# Patient Record
(demographics unavailable — no encounter records)

---

## 2024-11-26 NOTE — HISTORY OF PRESENT ILLNESS
[FreeTextEntry1] : Patient presents today for evaluation of multiple issues. She has bunions and hammertoes, progressing deformity that causes her grief and sensitivity. She has right venous insufficiency, right edema, but it has been annoying more recently. She is followed by vascular.

## 2024-11-26 NOTE — PHYSICAL EXAM
[0] : left foot posterior tibialis 0 [1+] : left foot dorsalis pedis 1+ [Vibration Dec.] : diminished vibratory sensation at the level of the toes [Position Sense Dec.] : diminished position sense at the level of the toes [Diminished Throughout Right Foot] : diminished sensation with monofilament testing throughout right foot [Diminished Throughout Left Foot] : diminished sensation with monofilament testing throughout left foot [FreeTextEntry3] : Thin, atrophic skin The patient has a class finding of Q8-Two Class B findings.  [de-identified] : She has generalized arthritis with bunions and hammertoes appreciated bilateral, minimally symptomatic  [FreeTextEntry1] : [place]-[employer] monofilament testing absent at the hallux, 1st MPJ and heel bilateral.

## 2024-11-26 NOTE — ASSESSMENT
[FreeTextEntry1] : Impression: Venous insufficiency. Varicose veins with inflammation. Hammertoe.  Treatment: I enucleated keratotic lesions, after prepping with alcohol. I discussed appropriate shoe gear and stretching and using soaks and Biofreeze for the arthritic hammertoes. I sent her for a venous duplex to R/O DVT.

## 2024-11-26 NOTE — PHYSICAL EXAM
[0] : left foot posterior tibialis 0 [1+] : left foot dorsalis pedis 1+ [Vibration Dec.] : diminished vibratory sensation at the level of the toes [Position Sense Dec.] : diminished position sense at the level of the toes [Diminished Throughout Right Foot] : diminished sensation with monofilament testing throughout right foot [Diminished Throughout Left Foot] : diminished sensation with monofilament testing throughout left foot [FreeTextEntry3] : Thin, atrophic skin The patient has a class finding of Q8-Two Class B findings.  [de-identified] : She has generalized arthritis with bunions and hammertoes appreciated bilateral, minimally symptomatic  [FreeTextEntry1] : [place]-Jc monofilament testing absent at the hallux, 1st MPJ and heel bilateral.

## 2024-11-27 NOTE — PLAN
[No] : No [Medication education provided] : Medication education provided. [Rationale for medication choices, possible risks/precautions, benefits, alternative treatment choices, and consequences of non-treatment discussed] : Rationale for medication choices, possible risks/precautions, benefits, alternative treatment choices, and consequences of non-treatment discussed with patient/family/caregiver  [FreeTextEntry5] : Psychoeducation and supportive therapy provided and discussed rationale for recommended med changes  Behavior activation Sleep hygiene education.  Medication:   Continue L-methylfolate 15 mg 1 tablet daily.   Continue desvenlafaxine 50 mg daily. Continue mirtazapine 30 mg HS  Educated patient of importance of remaining abstinent from drugs and alcohol.  Emergency procedures were discussed: pt. educated to call 911 or go to nearest ER for worsening of symptoms/suicidal/homicidal ideation.  RTC in 6-8 weeks or earlier as needed  Recommended patient to consider seeing a therapist for individual psychotherapy but that she feels that it was not helpful in the past and she is not interested at this time. Patient given opportunity to ask questions and their questions were answered and they expressed understanding and agreement with above plan.  I spent a total of 20 minutes for today's visit in evaluating and treating the patient as per above, including: preparing for patient's appointment (review of prior documents, [facility] ), performing a medically necessary exam/evaluation, communicating/counseling/educating the patient ordering medications

## 2024-11-27 NOTE — PHYSICAL EXAM
[None] : none [Average] : average [Cooperative] : cooperative [Full] : full [Clear] : clear [Linear/Goal Directed] : linear/goal directed [None Reported] : none reported [WNL] : within normal limits [FreeTextEntry8] : "a little better" [FreeTextEntry7] : No SI/HI

## 2024-11-27 NOTE — REASON FOR VISIT
[Patient preference] : as per patient preference [Continuity of care] : to ensure continuity of care [Telephone (audio) - Individual/Group] : This telephonic visit was provided via audio only technology. [Other Location: e.g. Home (Enter Location, City,State)___] : The provider was located at [unfilled]. [Home] : The patient, [unfilled], was located at home, [unfilled], at the time of the visit. [Patient's space is appropriate for telehealth and maintains privacy/confidentiality.] : Patient's space is appropriate for telehealth and maintains privacy/confidentiality. [Participant(s) identity verified] : Participant(s) identity verified. [Verbal consent obtained from patient/other participant(s)] : Verbal consent for telehealth/telephonic services obtained from patient/other participant(s) [Patient] : Patient [FreeTextEntry1] : depression

## 2024-11-27 NOTE — HISTORY OF PRESENT ILLNESS
[FreeTextEntry1] : Med changes made on last visit: None Patient states that the medication is "starting to help".  Patient reported that she is "not lazy anymore" and that she has been doing paperwork and things around the house.  Patient states that she also is going out to run errands made friends and they will go to [employer] regularly.  Patient reported her mood is better.  She reported sleeping from 11:30 PM to 9 AM and may wake up to use the bathroom once unable to fall back within a few minutes.  Patient reported her appetite is "a little better".  Patient denied feeling hopeless or helpless.  Patient denied passive or active SI.  Patient reported that she did not feel depressed in the last 2 days in general since the last visit she rates her depression symptoms at 3/10, 10 being the worst.  Patient reported that she is considering visiting her children for Thanksgiving. 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

## 2024-11-27 NOTE — PLAN
[No] : No [Medication education provided] : Medication education provided. [Rationale for medication choices, possible risks/precautions, benefits, alternative treatment choices, and consequences of non-treatment discussed] : Rationale for medication choices, possible risks/precautions, benefits, alternative treatment choices, and consequences of non-treatment discussed with patient/family/caregiver  [FreeTextEntry5] : Psychoeducation and supportive therapy provided and discussed rationale for recommended med changes  Behavior activation Sleep hygiene education.  Medication:   Continue L-methylfolate 15 mg 1 tablet daily.   Continue desvenlafaxine 50 mg daily. Continue mirtazapine 30 mg HS  Educated patient of importance of remaining abstinent from drugs and alcohol.  Emergency procedures were discussed: pt. educated to call 911 or go to nearest ER for worsening of symptoms/suicidal/homicidal ideation.  RTC in 6-8 weeks or earlier as needed  Recommended patient to consider seeing a therapist for individual psychotherapy but that she feels that it was not helpful in the past and she is not interested at this time. Patient given opportunity to ask questions and their questions were answered and they expressed understanding and agreement with above plan.  I spent a total of 20 minutes for today's visit in evaluating and treating the patient as per above, including: preparing for patient's appointment (review of prior documents, [facility] ), performing a medically necessary exam/evaluation, communicating/counseling/educating the patient ordering medications

## 2024-11-27 NOTE — HISTORY OF PRESENT ILLNESS
[FreeTextEntry1] : Med changes made on last visit: None Patient states that the medication is "starting to help".  Patient reported that she is "not lazy anymore" and that she has been doing paperwork and things around the house.  Patient states that she also is going out to run errands made friends and they will go to [employer] regularly.  Patient reported her mood is better.  She reported sleeping from 11:30 PM to 9 AM and may wake up to use the bathroom once unable to fall back within a few minutes.  Patient reported her appetite is "a little better".  Patient denied feeling hopeless or helpless.  Patient denied passive or active SI.  Patient reported that she did not feel depressed in the last 2 days in general since the last visit she rates her depression symptoms at 3/10, 10 being the worst.  Patient reported that she is considering visiting her children for Thanksgiving. 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

## 2024-11-28 NOTE — DISCUSSION/SUMMARY
[FreeTextEntry1] : Patient had a telehealth visit scheduled today at 11:30 AM.  Patient did not log into the appointment until 11:35 AM, writer called and left message for patient to log in to the visit ASAP or call office to reschedule if unable to log in today. Patient did not log in for the visit today.

## 2025-01-20 NOTE — REASON FOR VISIT
[Telehealth (audio & video) - Individual/Group] : This visit was provided via telehealth using real-time 2-way audio visual technology. [Medical Office: ([facility])___] : The provider was located at the medical office in [unfilled]. [FreeTextEntry1] : depression

## 2025-01-20 NOTE — DISCUSSION/SUMMARY
[FreeTextEntry1] : 11/1/2022: Patient is an 80-year-old woman with a history of symptoms consistent with major depressive disorder, recurrent with onset in early 50s, multiple medication trials with partial benefit, and she was on Prozac for when the symptoms of depression returned in fall 2021, and was doing well till beginning of September 2022 when her depression symptoms started to come back and the dose was increased to 60 mg daily and in the past 6 weeks she feels she is not seeing any improvement of symptoms of depression.  11/29/2022: Patient reported no side effects from Cymbalta.  She reported slight improvement of symptoms of depression, we will increase the Cymbalta dose for increased therapeutic benefit.  1/11/2023: Patient reported improvement of symptoms of depression after the last dose increase of Cymbalta and that she denied any side effects.  2/21/2023: Patient reported further improvement symptoms of depression since last visit and states that she no longer feels depressed and feels that the medications are effective lately keeping her depression symptoms in control  6/5/2023: Patient reported that she is feeling much better even though slight anxiety related to her lower leg edema she feels that the current medication regimen is keeping her symptoms of depression and anxiety in good control.  8/28/2023: Patient reported she continues to feel good with stable mood and no symptoms of depression still has some lower leg issues but feels that she is overall doing well psychiatrically even though she sometimes has vivid dreams which she had on previous antidepressants as well and they are not interfering with her sleep and she does not want to make any change to her antidepressants because she feels that these meds are working well.  11/14/2023: Patient reported that for about 3 to 4 weeks she is starting to feel depressed again with the low energy motivation and desire to do things feels like she is almost back to how she was feeling before duloxetine was added.  Patient reported that she had multiple medication trials including SSRIs and SNRIs and bupropion as well but they seem to work for a little bit and then stopped working.  Patient previously was on mirtazapine 45 mg at bedtime and had felt that it was not working so was augmented with Prozac but that did not seem to have a good effect and that is when fluoxetine was switched to duloxetine.   12/19/2023: Patient reported no change in her symptoms of depression since last visit, denied any adverse effects from Cymbalta 60 +20 mg daily.  Patient is going away on a trip for the holidays and prefers not to change medications if needed agreeable to increasing the Cymbalta dose for increased therapeutic benefit before considering any switch to other medications as patient already had multiple medication trials and switching right before she is going to travel would be more risk than benefit.  1/9/2024: The patient did not increase duloxetine dose, due to concerns about her BP being high normal at previous dose. She continues to feel depressed. She had multiple medication trials was stable on duloxetine 60 mg/day for several months until about a few weeks ago, when she started to experience reemergence of depression symptoms and would need a different medication trial.   1/26/2024: Patient tolerated tapering off the Cymbalta with mild withdrawal symptoms and is not experiencing any adverse effects from Trintellix.  Advised patient to increase the Trintellix dose to 10 mg for increased therapeutic benefit.  For now, we will continue the mirtazapine at the current dose and may consider lowering the dose or keeping it if it appears to be documenting Trintellix with the good effect on her mood symptoms.  2/16/2024: Patient reported no adverse effects from combination of Trintellix at current dose and the mirtazapine and she reports that for past couple of days she is feeling "a little better" and had a little more desire and energy to do things.  Patient was on Trintellix 10 mg for almost 2 and half to 3 weeks and since she is tolerating the current dose we will increase the dose for increased therapeutic benefit.  3/18/2024: Patient states she is doing more activities than before, no side effects from before, but states her mood is still not back to her usual and she is still feeling depressed. and since she is tolerating the current dose we will increase the dose for increased therapeutic benefit.  4/29/2024: Patient reports that she is "still not feeling great" and has low energy and motivation but appears to be doing more activities and reports that when she is out with friends and doing things her mood lives and that she is able to enjoy these activities.  Reviewed with patient past medication trials and appears that she has been a slow responder or only had partial response to medications discussed alternate treatment options such as switching to another medication however that would require coming off the medications that are partially effective at this time in order not to add more meds to avoid polypharmacy or considering TMS as an alternate treatment option and gold standard ECT however that would require more support and patient lives alone.  6/11/2024: The patient reported she is feeling a little better, but still depressed with low energy and motivation, and she does not feel depressed when she is with other people and she has been doing more activities than before, but still feels depressed.  She has multiple med trials, and in the past was a slow responder and the medication appears to have lost it's effect in a few months or a few years or had partial response to medications. Again, discussed alternate treatment options such as switching to another medication however that would require coming off the medications that are partially effective at this time in order not to add more meds to avoid polypharmacy or considering TMS as an alternate treatment option and gold standard ECT however that would require more support and patient lives alone. We could also consider pharmacogenomic testing for patients' genetic information for pharmacokinetic and antidepressant response to meds and based on the we may be able to make more informed decisions regarding med doses and or med switch.  7/31/2024: Patient reported she still continues to feel depressed and not back to her baseline and based on pharmacogenomic test results she may benefit from supplementing L-methylfolate to enhance the effects of her SSRI or SNRI.  Reviewed with the patient option to switch Trintellix to another SNRI and add L-methylfolate based on the pharmacogenomic test results and she is agreeable with the plan. 08/28/2024: The patient is tolerating well addition of desvenlafaxine, and L-methylfolate and reported improving symptoms of depression and prefers to continue same dose and monitor for incremental improvement.  10/2/2024: Patient reports an improvement of symptoms of depression, tolerating current medications and would like to continue the same dosage and monitor for incremental improvement of symptoms. 1/17/2025: Patient reported depression symptoms have significantly reduced and that she feels close to her baseline and appears to have her symptoms in remission with the current medication regimen which she is tolerating well.

## 2025-01-20 NOTE — REASON FOR VISIT
[Telehealth (audio & video) - Individual/Group] : This visit was provided via telehealth using real-time 2-way audio visual technology. [Medical Office: ([facility])___] : The provider was located at the medical office in [unfilled]. [FreeTextEntry1] : depression

## 2025-01-20 NOTE — HISTORY OF PRESENT ILLNESS
[FreeTextEntry1] : Med changes made on last visit: NonePatient apologized for missing her last appointment on November 27.  Patient states that she was busy packing to leave for Thanksgiving holiday as she was visiting her son.  Patient states that she has been doing very well since last visit and reports depression symptoms have significantly reduced, rates her depression symptoms at 1/10, 10 being the worst.  Patient reported that her appetite is good and that she is more active and engaged in doing activities, sees her friends for coffee and lunch etc. and playing [employer].  She also reported that going back to visit her son and spend the holidays with them.  Patient states that that she is falling asleep around 11:30 PM and is waking up around 6 AM and lays in the bed for another hour.  Patient states that she is only sleeping about 5 to 6 hours though has no trouble falling asleep and sleep is not interrupted and she does not feel tired when she wakes up in the morning.  Patient denied feeling hopeless or helpless and denied passive or active suicidal ideation. Patient reported no excessive alcohol use and denied any substance use. Patient reported that taking medications as prescribed and reports no side effects. Patient denied any new medical issues or medications since last visit.  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

## 2025-01-20 NOTE — HISTORY OF PRESENT ILLNESS
[FreeTextEntry1] : Med changes made on last visit: NonePatient apologized for missing her last appointment on November 27.  Patient states that she was busy packing to leave for Thanksgiving holiday as she was visiting her son.  Patient states that she has been doing very well since last visit and reports depression symptoms have significantly reduced, rates her depression symptoms at 1/10, 10 being the worst.  Patient reported that her appetite is good and that she is more active and engaged in doing activities, sees her friends for coffee and lunch etc. and playing [employer].  She also reported that going back to visit her son and spend the holidays with them.  Patient states that that she is falling asleep around 11:30 PM and is waking up around 6 AM and lays in the bed for another hour.  Patient states that she is only sleeping about 5 to 6 hours though has no trouble falling asleep and sleep is not interrupted and she does not feel tired when she wakes up in the morning.  Patient denied feeling hopeless or helpless and denied passive or active suicidal ideation. Patient reported no excessive alcohol use and denied any substance use. Patient reported that taking medications as prescribed and reports no side effects. Patient denied any new medical issues or medications since last visit.  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

## 2025-01-20 NOTE — PLAN
[FreeTextEntry5] : Psychoeducation and supportive therapy provided and discussed rationale for recommended med changes  Behavior activation Sleep hygiene education.  Medication:   Continue L-methylfolate 15 mg 1 tablet daily.   Continue desvenlafaxine 50 mg daily. Continue mirtazapine 30 mg HS  Educated patient of importance of remaining abstinent from drugs and alcohol.  Emergency procedures were discussed: pt. educated to call 911 or go to nearest ER for worsening of symptoms/suicidal/homicidal ideation.  RTC in 6-8 weeks or earlier as needed  Recommended patient to consider seeing a therapist for individual psychotherapy but that she feels that it was not helpful in the past and she is not interested at this time. Patient given opportunity to ask questions and their questions were answered and they expressed understanding and agreement with above plan.

## 2025-02-03 NOTE — PHYSICAL EXAM
[0] : left foot posterior tibialis 0 [1+] : left foot dorsalis pedis 1+ [Vibration Dec.] : diminished vibratory sensation at the level of the toes [Position Sense Dec.] : diminished position sense at the level of the toes [Diminished Throughout Right Foot] : diminished sensation with monofilament testing throughout right foot [Diminished Throughout Left Foot] : diminished sensation with monofilament testing throughout left foot [FreeTextEntry3] : Thin, atrophic skin The patient has a class finding of Q8-Two Class B findings.  [de-identified] : She has generalized arthritis with bunions and hammertoes appreciated bilateral, minimally symptomatic. Preulcerative lesion at the 2nd toe, left foot. [FreeTextEntry1] : [place]-[employer] monofilament testing absent at the hallux, 1st MPJ and heel bilateral.

## 2025-02-03 NOTE — HISTORY OF PRESENT ILLNESS
[FreeTextEntry1] : Patient presents today for routine foot care. She has hammertoes with preulcerative keratotic lesion that she cannot care for herself.  The patient's history and physical has been reviewed and verified with no changes.

## 2025-02-03 NOTE — PHYSICAL EXAM
[0] : left foot posterior tibialis 0 [1+] : left foot dorsalis pedis 1+ [Vibration Dec.] : diminished vibratory sensation at the level of the toes [Position Sense Dec.] : diminished position sense at the level of the toes [Diminished Throughout Right Foot] : diminished sensation with monofilament testing throughout right foot [Diminished Throughout Left Foot] : diminished sensation with monofilament testing throughout left foot [FreeTextEntry3] : Thin, atrophic skin The patient has a class finding of Q8-Two Class B findings.  [de-identified] : She has generalized arthritis with bunions and hammertoes appreciated bilateral, minimally symptomatic. Preulcerative lesion at the 2nd toe, left foot. [FreeTextEntry1] : [place]-Jc monofilament testing absent at the hallux, 1st MPJ and heel bilateral.

## 2025-02-03 NOTE — ASSESSMENT
[FreeTextEntry1] : Impression; Celine. Pain.  Treatment: The left 2nd toe was prepped with alcohol, and I trimmed the keratotic lesion without incident. I made toe crest pads. I want her to stretch and wear memory foam shoes. Follow-up in the office as needed. She should follow-up with Vascular regarding her veins.

## 2025-02-03 NOTE — PHYSICAL EXAM
[0] : left foot posterior tibialis 0 [1+] : left foot dorsalis pedis 1+ [Vibration Dec.] : diminished vibratory sensation at the level of the toes [Position Sense Dec.] : diminished position sense at the level of the toes [Diminished Throughout Right Foot] : diminished sensation with monofilament testing throughout right foot [Diminished Throughout Left Foot] : diminished sensation with monofilament testing throughout left foot [FreeTextEntry3] : Thin, atrophic skin The patient has a class finding of Q8-Two Class B findings.  [de-identified] : She has generalized arthritis with bunions and hammertoes appreciated bilateral, minimally symptomatic. Preulcerative lesion at the 2nd toe, left foot. [FreeTextEntry1] : [place]-Jc monofilament testing absent at the hallux, 1st MPJ and heel bilateral.

## 2025-03-19 NOTE — HISTORY OF PRESENT ILLNESS
[FreeTextEntry1] : - Follow up with PCP/Cardiologist. - Continue current medication regimen. - Continue to increase activity and walk daily as tolerated. Continue to use incentive spirometer.  - No driving or strenuous activity for six weeks after surgery. Avoid lifting >10 to 15lbs for first two months after surgery.  - Continue to use compression stockings. Keep legs elevated above heart when resting/sitting/sleeping. - Call MD if you experience fever, fatigue, dizziness, confusion, syncope, shortness of breath, chest pain not relieved with analgesics, increased redness/drainage from incision. - Follow up in CTS clinic in one year with [facility]   [FreeTextEntry1] : Patient presents today for evaluation of multiple issues. She has bunions and hammertoes, progressing deformity that causes her grief and sensitivity. She has right venous insufficiency, right edema, but it has been annoying more recently. She is followed by vascular.

## 2025-03-21 NOTE — HISTORY OF PRESENT ILLNESS
[FreeTextEntry1] : 42F, RLE swelling and chronic wound. \par  Pt had sig trauma in 2020 to bilat lower ext, chronic wound R post knee/calf.\par  Only recently has healed\par  \par  Now w significant RLE swellig, including the knee to forefoot.  She had been using wraps, and lymphedema pump, but now has discomfort when using those due to excessive swelling\par  \par  She also had known VI, prior SSV ablation on the right.\par  \par  hld, hypothyroid, htn\par  \par  no smoking\par  + hx LLE dvt, associated w her 2020 trauma [de-identified] : 7/21/24: Patient is a 83 y/o with history of trauma to RLE leading to lymphedema and chronic wounds presents for follow up. The patient was last seen in 2023. Since then has visited the lymphedema clinic with improvements in swelling. She currently wears a Velcro strap and feels like her discomfort and swelling is improving.  3/21/25: pt had been doing well, but recently noticed worsening swelling of the RLE.  no pain, fevers, no signs infection.  Also notes the left ankle has darkening, and constant itching she has been wearing comrpession

## 2025-03-21 NOTE — ASSESSMENT
[FreeTextEntry1] : On the R side, she previously responded well to lymphedema therapy/MDT referral given  on the L, sx are primarily from VI.  she has been wearing compression, and still had progression of disease plan L GSV rfa, +- VV stab phleb.  pt will call to schedule

## 2025-03-21 NOTE — PHYSICAL EXAM
[JVD] : no jugular venous distention  [Respiratory Effort] : normal respiratory effort [Varicose Veins Of Lower Extremities] : bilaterally [] : bilaterally [de-identified] : awake, alert [FreeTextEntry1] : feet warm + circumferential scarring RLE just below knee.  small scabbed over wound no signs infection ++ RLE non pitting edema + stemmers bilat VV, Large VV on the L L  ankle hyperpigmentation

## 2025-03-25 NOTE — PHYSICAL EXAM
[0] : left foot posterior tibialis 0 [1+] : left foot dorsalis pedis 1+ [Vibration Dec.] : diminished vibratory sensation at the level of the toes [Position Sense Dec.] : diminished position sense at the level of the toes [Diminished Throughout Right Foot] : diminished sensation with monofilament testing throughout right foot [Diminished Throughout Left Foot] : diminished sensation with monofilament testing throughout left foot [FreeTextEntry3] : Thin, atrophic skin The patient has a class finding of Q8-Two Class B findings.  [de-identified] : She has generalized arthritis with bunions and hammertoes appreciated bilateral, minimally symptomatic. Preulcerative lesion at the 2nd toe, left foot. [FreeTextEntry1] : [place]-Jc monofilament testing absent at the hallux, 1st MPJ and heel bilateral.

## 2025-03-25 NOTE — PHYSICAL EXAM
[0] : left foot posterior tibialis 0 [1+] : left foot dorsalis pedis 1+ [Vibration Dec.] : diminished vibratory sensation at the level of the toes [Position Sense Dec.] : diminished position sense at the level of the toes [Diminished Throughout Right Foot] : diminished sensation with monofilament testing throughout right foot [Diminished Throughout Left Foot] : diminished sensation with monofilament testing throughout left foot [FreeTextEntry3] : Thin, atrophic skin The patient has a class finding of Q8-Two Class B findings.  [de-identified] : She has generalized arthritis with bunions and hammertoes appreciated bilateral, minimally symptomatic. Preulcerative lesion at the 2nd toe, left foot. [FreeTextEntry1] : [place]-Jc monofilament testing absent at the hallux, 1st MPJ and heel bilateral.

## 2025-03-25 NOTE — ASSESSMENT
[FreeTextEntry1] : Impression; Celine. Pain.  Treatment: After areas were prepped with alcohol, I trimmed the keratotic lesion without incident. I made a toe crest pad to destress the area. I told her that her shoes are totally inadequate, and I want her to get new with memory foam.  I want her to work on stretching. She is going to follow-up with vascular.  Follow-up in the office as needed.

## 2025-03-25 NOTE — HISTORY OF PRESENT ILLNESS
[FreeTextEntry1] : Patient presents today for evaluation. She has a left 2nd preulcerative lesion at the tip of the toe and sub 1 preulcerative lesion. She has peripheral neuropathy. She has very arthritic forefoot that is worsening.

## 2025-03-25 NOTE — PHYSICAL EXAM
[0] : left foot posterior tibialis 0 [1+] : left foot dorsalis pedis 1+ [Vibration Dec.] : diminished vibratory sensation at the level of the toes [Position Sense Dec.] : diminished position sense at the level of the toes [Diminished Throughout Right Foot] : diminished sensation with monofilament testing throughout right foot [Diminished Throughout Left Foot] : diminished sensation with monofilament testing throughout left foot [FreeTextEntry3] : Thin, atrophic skin The patient has a class finding of Q8-Two Class B findings.  [de-identified] : She has generalized arthritis with bunions and hammertoes appreciated bilateral, minimally symptomatic. Preulcerative lesion at the 2nd toe, left foot. [FreeTextEntry1] : [place]-Jc monofilament testing absent at the hallux, 1st MPJ and heel bilateral.

## 2025-04-15 NOTE — PHYSICAL EXAM
[0] : left foot posterior tibialis 0 [1+] : left foot dorsalis pedis 1+ [Vibration Dec.] : diminished vibratory sensation at the level of the toes [Position Sense Dec.] : diminished position sense at the level of the toes [Diminished Throughout Right Foot] : diminished sensation with monofilament testing throughout right foot [Diminished Throughout Left Foot] : diminished sensation with monofilament testing throughout left foot [FreeTextEntry3] : Thin, atrophic skin The patient has a class finding of Q8-Two Class B findings.  [de-identified] : Semi-rigid arthritic hammertoes with bunion, left side worse than right. [FreeTextEntry1] : [place]-Jc monofilament testing absent at the hallux, 1st MPJ and heel bilateral.

## 2025-04-15 NOTE — PHYSICAL EXAM
[0] : left foot posterior tibialis 0 [1+] : left foot dorsalis pedis 1+ [Vibration Dec.] : diminished vibratory sensation at the level of the toes [Position Sense Dec.] : diminished position sense at the level of the toes [Diminished Throughout Right Foot] : diminished sensation with monofilament testing throughout right foot [Diminished Throughout Left Foot] : diminished sensation with monofilament testing throughout left foot [FreeTextEntry3] : Thin, atrophic skin The patient has a class finding of Q8-Two Class B findings.  [de-identified] : Semi-rigid arthritic hammertoes with bunion, left side worse than right. [FreeTextEntry1] : [place]-Jc monofilament testing absent at the hallux, 1st MPJ and heel bilateral.

## 2025-04-15 NOTE — PHYSICAL EXAM
[0] : left foot posterior tibialis 0 [1+] : left foot dorsalis pedis 1+ [Vibration Dec.] : diminished vibratory sensation at the level of the toes [Position Sense Dec.] : diminished position sense at the level of the toes [Diminished Throughout Right Foot] : diminished sensation with monofilament testing throughout right foot [Diminished Throughout Left Foot] : diminished sensation with monofilament testing throughout left foot [FreeTextEntry3] : Thin, atrophic skin The patient has a class finding of Q8-Two Class B findings.  [de-identified] : Semi-rigid arthritic hammertoes with bunion, left side worse than right. [FreeTextEntry1] : [place]-Jc monofilament testing absent at the hallux, 1st MPJ and heel bilateral.

## 2025-04-15 NOTE — HISTORY OF PRESENT ILLNESS
[FreeTextEntry1] : Patient presents today because of left 2nd hammertoe. It is painful. She has preulcerative keratotic lesion at the instep. She just got memory foam shoes that is more helpful. She is looking at surgical procedure venous intervention.

## 2025-04-15 NOTE — ASSESSMENT
[FreeTextEntry1] : Impression: Hammnielses. Pain.  Treatment: After areas were prepped with alcohol, I trimmed the keratotic lesion without incident. Continue memory foam shoes. I made toe crest pads to destress the area. I recommended Aquaphor. She should inspect the foot daily. Follow-up in the office as needed.

## 2025-04-15 NOTE — HISTORY OF PRESENT ILLNESS
[FreeTextEntry1] : Patient presents today because of left 2nd hammertoe. It is painful. She has preulcerative keratotic lesion at the instep. She just got memory foam shoes that is more helpful. She is looking at surgical procedure venous intervention.  Arm band on/IV intact

## 2025-05-04 NOTE — DISCUSSION/SUMMARY
[FreeTextEntry1] : 11/1/2022: Patient is an 80-year-old woman with a history of symptoms consistent with major depressive disorder, recurrent with onset in early 50s, multiple medication trials with partial benefit, and she was on Prozac for when the symptoms of depression returned in fall 2021, and was doing well till beginning of September 2022 when her depression symptoms started to come back and the dose was increased to 60 mg daily and in the past 6 weeks she feels she is not seeing any improvement of symptoms of depression.  11/29/2022: Patient reported no side effects from Cymbalta.  She reported slight improvement of symptoms of depression, we will increase the Cymbalta dose for increased therapeutic benefit.  1/11/2023: Patient reported improvement of symptoms of depression after the last dose increase of Cymbalta and that she denied any side effects.  2/21/2023: Patient reported further improvement symptoms of depression since last visit and states that she no longer feels depressed and feels that the medications are effective lately keeping her depression symptoms in control  6/5/2023: Patient reported that she is feeling much better even though slight anxiety related to her lower leg edema she feels that the current medication regimen is keeping her symptoms of depression and anxiety in good control.  8/28/2023: Patient reported she continues to feel good with stable mood and no symptoms of depression still has some lower leg issues but feels that she is overall doing well psychiatrically even though she sometimes has vivid dreams which she had on previous antidepressants as well and they are not interfering with her sleep and she does not want to make any change to her antidepressants because she feels that these meds are working well.  11/14/2023: Patient reported that for about 3 to 4 weeks she is starting to feel depressed again with the low energy motivation and desire to do things feels like she is almost back to how she was feeling before duloxetine was added.  Patient reported that she had multiple medication trials including SSRIs and SNRIs and bupropion as well but they seem to work for a little bit and then stopped working.  Patient previously was on mirtazapine 45 mg at bedtime and had felt that it was not working so was augmented with Prozac but that did not seem to have a good effect and that is when fluoxetine was switched to duloxetine.   12/19/2023: Patient reported no change in her symptoms of depression since last visit, denied any adverse effects from Cymbalta 60 +20 mg daily.  Patient is going away on a trip for the holidays and prefers not to change medications if needed agreeable to increasing the Cymbalta dose for increased therapeutic benefit before considering any switch to other medications as patient already had multiple medication trials and switching right before she is going to travel would be more risk than benefit.  1/9/2024: The patient did not increase duloxetine dose, due to concerns about her BP being high normal at previous dose. She continues to feel depressed. She had multiple medication trials was stable on duloxetine 60 mg/day for several months until about a few weeks ago, when she started to experience reemergence of depression symptoms and would need a different medication trial.   1/26/2024: Patient tolerated tapering off the Cymbalta with mild withdrawal symptoms and is not experiencing any adverse effects from Trintellix.  Advised patient to increase the Trintellix dose to 10 mg for increased therapeutic benefit.  For now, we will continue the mirtazapine at the current dose and may consider lowering the dose or keeping it if it appears to be documenting Trintellix with the good effect on her mood symptoms.  2/16/2024: Patient reported no adverse effects from combination of Trintellix at current dose and the mirtazapine and she reports that for past couple of days she is feeling "a little better" and had a little more desire and energy to do things.  Patient was on Trintellix 10 mg for almost 2 and half to 3 weeks and since she is tolerating the current dose we will increase the dose for increased therapeutic benefit.  3/18/2024: Patient states she is doing more activities than before, no side effects from before, but states her mood is still not back to her usual and she is still feeling depressed. and since she is tolerating the current dose we will increase the dose for increased therapeutic benefit.  4/29/2024: Patient reports that she is "still not feeling great" and has low energy and motivation but appears to be doing more activities and reports that when she is out with friends and doing things her mood lives and that she is able to enjoy these activities.  Reviewed with patient past medication trials and appears that she has been a slow responder or only had partial response to medications discussed alternate treatment options such as switching to another medication however that would require coming off the medications that are partially effective at this time in order not to add more meds to avoid polypharmacy or considering TMS as an alternate treatment option and gold standard ECT however that would require more support and patient lives alone.  6/11/2024: The patient reported she is feeling a little better, but still depressed with low energy and motivation, and she does not feel depressed when she is with other people and she has been doing more activities than before, but still feels depressed.  She has multiple med trials, and in the past was a slow responder and the medication appears to have lost it's effect in a few months or a few years or had partial response to medications. Again, discussed alternate treatment options such as switching to another medication however that would require coming off the medications that are partially effective at this time in order not to add more meds to avoid polypharmacy or considering TMS as an alternate treatment option and gold standard ECT however that would require more support and patient lives alone. We could also consider pharmacogenomic testing for patients' genetic information for pharmacokinetic and antidepressant response to meds and based on the we may be able to make more informed decisions regarding med doses and or med switch.  7/31/2024: Patient reported she still continues to feel depressed and not back to her baseline and based on pharmacogenomic test results she may benefit from supplementing L-methylfolate to enhance the effects of her SSRI or SNRI.  Reviewed with the patient option to switch Trintellix to another SNRI and add L-methylfolate based on the pharmacogenomic test results and she is agreeable with the plan. 08/28/2024: The patient is tolerating well addition of desvenlafaxine, and L-methylfolate and reported improving symptoms of depression and prefers to continue same dose and monitor for incremental improvement.  10/2/2024: Patient reports an improvement of symptoms of depression, tolerating current medications and would like to continue the same dosage and monitor for incremental improvement of symptoms. 1/17/2025: Patient reported depression symptoms have significantly reduced and that she feels close to her baseline and appears to have her symptoms in remission with the current medication regimen which she is tolerating well. 4/30/2025: The patient reports overall sustained improvement in her psychiatric symptoms with current medication regimen. She is currently dealing with sciatica pain and associated stress. Recent psychosocial stressors include her brother's passing and anxiety about upcoming travel plans. Despite these challenges, she maintains motivation for daily activities. No significant side effects or adherence issues with psychiatric medications were reported.

## 2025-05-04 NOTE — REASON FOR VISIT
[Patient preference] : as per patient preference [Continuity of care] : to ensure continuity of care [Medical Office: ([facility])___] : The provider was located at the medical office in [unfilled]. [Home] : The patient, [unfilled], was located at home, [unfilled], at the time of the visit. [Patient's space is appropriate for telehealth and maintains privacy/confidentiality.] : Patient's space is appropriate for telehealth and maintains privacy/confidentiality. [Participant(s) identity verified] : Participant(s) identity verified. [Verbal consent obtained from patient/other participant(s)] : Verbal consent for telehealth/telephonic services obtained from patient/other participant(s) [Patient] : Patient [Telephone (audio) - Individual/Group] : This telephonic visit was provided via audio only technology. [Technical or other issues] : Patient unable to effectively utilize tele-video due to technical or other issues. [FreeTextEntry1] : depression

## 2025-05-04 NOTE — REASON FOR VISIT
[Patient preference] : as per patient preference [Continuity of care] : to ensure continuity of care [Medical Office: ([facility])___] : The provider was located at the medical office in [unfilled]. [Home] : The patient, [unfilled], was located at home, [unfilled], at the time of the visit. [Patient's space is appropriate for telehealth and maintains privacy/confidentiality.] : Patient's space is appropriate for telehealth and maintains privacy/confidentiality. [Participant(s) identity verified] : Participant(s) identity verified. [Verbal consent obtained from patient/other participant(s)] : Verbal consent for telehealth/telephonic services obtained from patient/other participant(s) [Patient] : Patient [Telephone (audio) - Individual/Group] : This telephonic visit was provided via audio only technology. [Technical or other issues] : Patient unable to effectively utilize tele-video due to technical or other issues. [FreeTextEntry1] : depression

## 2025-05-04 NOTE — HISTORY OF PRESENT ILLNESS
[FreeTextEntry1] : Med changes made on last visit: None The patient reports feeling much better since the last visit, about 3 months ago. She believes the medication is working.  Patient reports improved mood, stating she feels much better. Patient reports being under stress, particularly related physical pain from sciatica for about 5 weeks, which is causing significant pain and also becasue of her granddaughter's graduation and difficulties obtaining a REAL ID. She also mentions stress intensifying her physical pain.  Patient reports some sleep disturbances, mentioning trouble falling asleep the previous night. Sleep may be affected by sciatica pain. Patient states despite pain she is trying to go out and remain socially active.  Side effects from medications: No specific side effects reported from psychiatric medications. Adherence to medication: Patient appears to be adhering to psychiatric medications as prescribed.  Medical update: Patient is experiencing sciatica related to herniated discs in her lower back. She has been prescribed Tramadol for pain management. She states she is taking less than prescribed, using 25mg twice daily instead of 50mg every 6 hours.Her primary care physician has recommended an MRI. Substance use history:no ETOH use/abuse and or any other illicit drug use including cannabis.  Psychosocial history: Patient's brother recently passed away. He had suffered for 43 years following a work-related accident. Patient is also experiencing stress related to her granddaughter's upcoming graduation and difficulties obtaining a REAL ID for travel.  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

## 2025-05-04 NOTE — PLAN
[No] : No [Medication education provided] : Medication education provided. [Rationale for medication choices, possible risks/precautions, benefits, alternative treatment choices, and consequences of non-treatment discussed] : Rationale for medication choices, possible risks/precautions, benefits, alternative treatment choices, and consequences of non-treatment discussed with patient/family/caregiver  [FreeTextEntry5] : Psychoeducation and supportive therapy provided and discussed rationale for recommended med changes  Behavior activation Sleep hygiene education.  Medication:   Continue L-methylfolate 15 mg 1 tablet daily.   Continue desvenlafaxine 50 mg daily. Continue mirtazapine 30 mg HS  Educated patient of importance of remaining abstinent from drugs and alcohol.  Emergency procedures were discussed: pt. educated to call 911 or go to nearest ER for worsening of symptoms/suicidal/homicidal ideation.  RTC in 6-8 weeks or earlier as needed  Recommended patient to consider seeing a therapist for individual psychotherapy but that she feels that it was not helpful in the past and she is not interested at this time. Patient given opportunity to ask questions and their questions were answered and they expressed understanding and agreement with above plan.

## 2025-05-04 NOTE — PHYSICAL EXAM
[None] : none [Average] : average [Cooperative] : cooperative [Full] : full [Clear] : clear [Linear/Goal Directed] : linear/goal directed [None Reported] : none reported [WNL] : within normal limits [FreeTextEntry8] : "better" [FreeTextEntry7] : No SI/HI

## 2025-05-04 NOTE — HISTORY OF PRESENT ILLNESS
[FreeTextEntry1] : Med changes made on last visit: None The patient reports feeling much better since the last visit, about 3 months ago. She believes the medication is working.  Patient reports improved mood, stating she feels much better. Patient reports being under stress, particularly related physical pain from sciatica for about 5 weeks, which is causing significant pain and also becasue of her granddaughter's graduation and difficulties obtaining a REAL ID. She also mentions stress intensifying her physical pain.  Patient reports some sleep disturbances, mentioning trouble falling asleep the previous night. Sleep may be affected by sciatica pain. Patient states despite pain she is trying to go out and remain socially active.  Side effects from medications: No specific side effects reported from psychiatric medications. Adherence to medication: Patient appears to be adhering to psychiatric medications as prescribed.  Medical update: Patient is experiencing sciatica related to herniated discs in her lower back. She has been prescribed Tramadol for pain management. She states she is taking less than prescribed, using 25mg twice daily instead of 50mg every 6 hours.Her primary care physician has recommended an MRI. Substance use history:no ETOH use/abuse and or any other illicit drug use including cannabis.  Psychosocial history: Patient's brother recently passed away. He had suffered for 43 years following a work-related accident. Patient is also experiencing stress related to her granddaughter's upcoming graduation and difficulties obtaining a REAL ID for travel.  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

## 2025-06-09 NOTE — ASSESSMENT
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  Onychomycosis, painful (B35.1) (M79.676).  Venous insufficiency (I872).  Lymphedema (I89.0).    Treatment: Continue vascular follow up for management of lymphedema and venous insufficiency. For onychomycosis: Nails x 10 were debrided of excessive thickness and length to appropriate levels of comfort and contour using sterile nippers and Dremel.   The procedure was tolerated well without complications.   All preulcerative lesions (2) were wiped with alcohol and enucleated with a sterile #15 blade revealing no ulcerations underneath.   Due to patient's past medical history, advised patient to be very cautious and check feet daily for any changes, open lesions, redness, swelling or drainage.  Advised patient to wear well-padded supportive shoe gear at all times to decrease risk of skin breakdown.   Return: 10 weeks with Dr. Watkins.

## 2025-06-09 NOTE — HISTORY OF PRESENT ILLNESS
[FreeTextEntry1] : Patient returns today for follow up onychomycotic, elongated, thickened nails that she cannot care for herself as well as preulcerative skin lesions.  Denies any new medical changes.  She has not yet gone for vascular intervention with Dr. Muhammad and has not yet gone to PT for lymphedema.  Denies any open lesions, bleeding, redness but does report pain to the toes.

## 2025-06-09 NOTE — PHYSICAL EXAM
[FreeTextEntry3] : CFT: 3 seconds x 10.  Temperature gradient: warm to cool.  [de-identified] : Negative John's sign.  Semi-rigid arthritic hammertoes with bunion, left side worse than right. [FreeTextEntry1] : 5.07 [place]-Jc monofilament testing absent at the hallux, 1st MPJ and heel bilateral.  Q8 - The patient has class findings of Q8 - Two Class B findings.

## 2025-06-09 NOTE — PHYSICAL EXAM
[FreeTextEntry3] : CFT: 3 seconds x 10.  Temperature gradient: warm to cool.  [de-identified] : Negative John's sign.  Semi-rigid arthritic hammertoes with bunion, left side worse than right. [FreeTextEntry1] : 5.07 [place]-Jc monofilament testing absent at the hallux, 1st MPJ and heel bilateral.  Q8 - The patient has class findings of Q8 - Two Class B findings.

## 2025-06-09 NOTE — PHYSICAL EXAM
[FreeTextEntry3] : CFT: 3 seconds x 10.  Temperature gradient: warm to cool.  [de-identified] : Negative John's sign.  Semi-rigid arthritic hammertoes with bunion, left side worse than right. [FreeTextEntry1] : 5.07 [place]-Jc monofilament testing absent at the hallux, 1st MPJ and heel bilateral.  Q8 - The patient has class findings of Q8 - Two Class B findings.

## 2025-07-21 NOTE — REASON FOR VISIT
[Patient preference] : as per patient preference [Continuity of care] : to ensure continuity of care [Telephone (audio) - Individual/Group] : This telephonic visit was provided via audio only technology. [Technical or other issues] : Patient unable to effectively utilize tele-video due to technical or other issues. [Medical Office: ([facility])___] : The provider was located at the medical office in [unfilled]. [Home] : The patient, [unfilled], was located at home, [unfilled], at the time of the visit. [Patient's space is appropriate for telehealth and maintains privacy/confidentiality.] : Patient's space is appropriate for telehealth and maintains privacy/confidentiality. [Participant(s) identity verified] : Participant(s) identity verified. [Verbal consent obtained from patient/other participant(s)] : Verbal consent for telehealth/telephonic services obtained from patient/other participant(s) [Patient] : Patient [Other Location: e.g. Home (Enter Location, City,State)___] : The provider was located at [unfilled]. [FreeTextEntry1] : depression

## 2025-07-21 NOTE — DISCUSSION/SUMMARY
[FreeTextEntry1] : 11/1/2022: Patient is an 80-year-old woman with a history of symptoms consistent with major depressive disorder, recurrent with onset in early 50s, multiple medication trials with partial benefit, and she was on Prozac for when the symptoms of depression returned in fall 2021, and was doing well till beginning of September 2022 when her depression symptoms started to come back and the dose was increased to 60 mg daily and in the past 6 weeks she feels she is not seeing any improvement of symptoms of depression.  11/29/2022: Patient reported no side effects from Cymbalta.  She reported slight improvement of symptoms of depression, we will increase the Cymbalta dose for increased therapeutic benefit.  1/11/2023: Patient reported improvement of symptoms of depression after the last dose increase of Cymbalta and that she denied any side effects.  2/21/2023: Patient reported further improvement symptoms of depression since last visit and states that she no longer feels depressed and feels that the medications are effective lately keeping her depression symptoms in control  6/5/2023: Patient reported that she is feeling much better even though slight anxiety related to her lower leg edema she feels that the current medication regimen is keeping her symptoms of depression and anxiety in good control.  8/28/2023: Patient reported she continues to feel good with stable mood and no symptoms of depression still has some lower leg issues but feels that she is overall doing well psychiatrically even though she sometimes has vivid dreams which she had on previous antidepressants as well and they are not interfering with her sleep and she does not want to make any change to her antidepressants because she feels that these meds are working well.  11/14/2023: Patient reported that for about 3 to 4 weeks she is starting to feel depressed again with the low energy motivation and desire to do things feels like she is almost back to how she was feeling before duloxetine was added.  Patient reported that she had multiple medication trials including SSRIs and SNRIs and bupropion as well but they seem to work for a little bit and then stopped working.  Patient previously was on mirtazapine 45 mg at bedtime and had felt that it was not working so was augmented with Prozac but that did not seem to have a good effect and that is when fluoxetine was switched to duloxetine.   12/19/2023: Patient reported no change in her symptoms of depression since last visit, denied any adverse effects from Cymbalta 60 +20 mg daily.  Patient is going away on a trip for the holidays and prefers not to change medications if needed agreeable to increasing the Cymbalta dose for increased therapeutic benefit before considering any switch to other medications as patient already had multiple medication trials and switching right before she is going to travel would be more risk than benefit.  1/9/2024: The patient did not increase duloxetine dose, due to concerns about her BP being high normal at previous dose. She continues to feel depressed. She had multiple medication trials was stable on duloxetine 60 mg/day for several months until about a few weeks ago, when she started to experience reemergence of depression symptoms and would need a different medication trial.   1/26/2024: Patient tolerated tapering off the Cymbalta with mild withdrawal symptoms and is not experiencing any adverse effects from Trintellix.  Advised patient to increase the Trintellix dose to 10 mg for increased therapeutic benefit.  For now, we will continue the mirtazapine at the current dose and may consider lowering the dose or keeping it if it appears to be documenting Trintellix with the good effect on her mood symptoms.  2/16/2024: Patient reported no adverse effects from combination of Trintellix at current dose and the mirtazapine and she reports that for past couple of days she is feeling "a little better" and had a little more desire and energy to do things.  Patient was on Trintellix 10 mg for almost 2 and half to 3 weeks and since she is tolerating the current dose we will increase the dose for increased therapeutic benefit.  3/18/2024: Patient states she is doing more activities than before, no side effects from before, but states her mood is still not back to her usual and she is still feeling depressed. and since she is tolerating the current dose we will increase the dose for increased therapeutic benefit.  4/29/2024: Patient reports that she is "still not feeling great" and has low energy and motivation but appears to be doing more activities and reports that when she is out with friends and doing things her mood lives and that she is able to enjoy these activities.  Reviewed with patient past medication trials and appears that she has been a slow responder or only had partial response to medications discussed alternate treatment options such as switching to another medication however that would require coming off the medications that are partially effective at this time in order not to add more meds to avoid polypharmacy or considering TMS as an alternate treatment option and gold standard ECT however that would require more support and patient lives alone.  6/11/2024: The patient reported she is feeling a little better, but still depressed with low energy and motivation, and she does not feel depressed when she is with other people and she has been doing more activities than before, but still feels depressed.  She has multiple med trials, and in the past was a slow responder and the medication appears to have lost it's effect in a few months or a few years or had partial response to medications. Again, discussed alternate treatment options such as switching to another medication however that would require coming off the medications that are partially effective at this time in order not to add more meds to avoid polypharmacy or considering TMS as an alternate treatment option and gold standard ECT however that would require more support and patient lives alone. We could also consider pharmacogenomic testing for patients' genetic information for pharmacokinetic and antidepressant response to meds and based on the we may be able to make more informed decisions regarding med doses and or med switch.  7/31/2024: Patient reported she still continues to feel depressed and not back to her baseline and based on pharmacogenomic test results she may benefit from supplementing L-methylfolate to enhance the effects of her SSRI or SNRI.  Reviewed with the patient option to switch Trintellix to another SNRI and add L-methylfolate based on the pharmacogenomic test results and she is agreeable with the plan. 08/28/2024: The patient is tolerating well addition of desvenlafaxine, and L-methylfolate and reported improving symptoms of depression and prefers to continue same dose and monitor for incremental improvement.  10/2/2024: Patient reports an improvement of symptoms of depression, tolerating current medications and would like to continue the same dosage and monitor for incremental improvement of symptoms. 1/17/2025: Patient reported depression symptoms have significantly reduced and that she feels close to her baseline and appears to have her symptoms in remission with the current medication regimen which she is tolerating well. 4/30/2025: The patient reports overall sustained improvement in her psychiatric symptoms with current medication regimen. She is currently dealing with sciatica pain and associated stress. Recent psychosocial stressors include her brother's passing and anxiety about upcoming travel plans. Despite these challenges, she maintains motivation for daily activities. No significant side effects or adherence issues with psychiatric medications were reported. 7/16/2025:  Patient reports significant improvement in mood and overall well-being with current medication regimen.

## 2025-07-21 NOTE — REASON FOR VISIT
[Patient preference] : as per patient preference [Continuity of care] : to ensure continuity of care [Telephone (audio) - Individual/Group] : This telephonic visit was provided via audio only technology. [Technical or other issues] : Patient unable to effectively utilize tele-video due to technical or other issues. [Medical Office: ([facility])___] : The provider was located at the medical office in [unfilled]. [Home] : The patient, [unfilled], was located at home, [unfilled], at the time of the visit. [Patient's space is appropriate for telehealth and maintains privacy/confidentiality.] : Patient's space is appropriate for telehealth and maintains privacy/confidentiality. [Participant(s) identity verified] : Participant(s) identity verified. [Verbal consent obtained from patient/other participant(s)] : Verbal consent for telehealth/telephonic services obtained from patient/other participant(s) [Patient] : Patient [Other Location: e.g. Home (Enter Location, City,State)___] : The provider was located at [unfilled]. [FreeTextEntry1] : depression

## 2025-07-21 NOTE — HISTORY OF PRESENT ILLNESS
[FreeTextEntry1] : Patient reports feeling much better with current medication regimen. She states she attended granddaughter's college graduation in [place]. She states she is experiencing sciatica pain, had an MRI on Monday, awaiting results. She states she is prescribed tramadol for pain management which she takes when needed.  Patient reports overall mood has been good and much improved.  Anxiety: none reported   Sleep: Patient reports sleep is 'Pretty good. Sometimes better than other times' Appetite: Patient reports appetite has improved Energy: Patient reports good energy levels Motivation: Patient states she is motivated and 'doing things' Patient states she is maintaining social activities, including going out for dinner with friends to celebrate birthdays. Some limitation in physical activity due to sciatica pain Psychotic Symptoms: none  No passive/active SI.   Side effects from medications: none reported   Adherence to medication: good   Substance use history: Patient occasionally drinks a wine spritzer (2 oz wine, 4 oz soda). Currently abstaining from alcohol due to potential need for pain medication Medical update: Patient experiencing sciatica pain, had an MRI on Monday. Prescribed tramadol for pain management. History of lower back issues for about 8 years. 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

## 2025-07-21 NOTE — HISTORY OF PRESENT ILLNESS
[FreeTextEntry1] : Patient reports feeling much better with current medication regimen. She states she attended granddaughter's college graduation in [place]. She states she is experiencing sciatica pain, had an MRI on Monday, awaiting results. She states she is prescribed tramadol for pain management which she takes when needed.  Patient reports overall mood has been good and much improved.  Anxiety: none reported   Sleep: Patient reports sleep is 'Pretty good. Sometimes better than other times' Appetite: Patient reports appetite has improved Energy: Patient reports good energy levels Motivation: Patient states she is motivated and 'doing things' Patient states she is maintaining social activities, including going out for dinner with friends to celebrate birthdays. Some limitation in physical activity due to sciatica pain Psychotic Symptoms: none  No passive/active SI.   Side effects from medications: none reported   Adherence to medication: good   Substance use history: Patient occasionally drinks a wine spritzer (2 oz wine, 4 oz soda). Currently abstaining from alcohol due to potential need for pain medication Medical update: Patient experiencing sciatica pain, had an MRI on Monday. Prescribed tramadol for pain management. History of lower back issues for about 8 years. 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

## 2025-07-21 NOTE — PHYSICAL EXAM
[Cooperative] : cooperative [Full] : full [Clear] : clear [Linear/Goal Directed] : linear/goal directed [None Reported] : none reported [WNL] : within normal limits [FreeTextEntry8] : "better" [FreeTextEntry7] : No SI/HI

## 2025-07-21 NOTE — PLAN
[No] : No [Medication education provided] : Medication education provided. [Rationale for medication choices, possible risks/precautions, benefits, alternative treatment choices, and consequences of non-treatment discussed] : Rationale for medication choices, possible risks/precautions, benefits, alternative treatment choices, and consequences of non-treatment discussed with patient/family/caregiver  [FreeTextEntry5] : Psychoeducation provided: Advised caution with Advil use, recommended taking with food to reduce risk of ulcers Supportive therapy: Provided supportive listening and positive reinforcement for improvements Encouraged patient to continue social activities and enjoy the summer while resting as needed Medication:   Continue L-methylfolate 15 mg 1 tablet daily.   Continue desvenlafaxine 50 mg daily. Continue mirtazapine 30 mg HS  Educated patient of importance of remaining abstinent from drugs and alcohol.  Emergency procedures were discussed: pt. educated to call 911 or go to nearest ER for worsening of symptoms/suicidal/homicidal ideation.  RTC in 2.5 to 3 months or earlier as needed  Patient given opportunity to ask questions and their questions were answered and they expressed understanding and agreement with above plan.